# Patient Record
Sex: MALE | Race: OTHER | Employment: OTHER | URBAN - METROPOLITAN AREA
[De-identification: names, ages, dates, MRNs, and addresses within clinical notes are randomized per-mention and may not be internally consistent; named-entity substitution may affect disease eponyms.]

---

## 2017-09-21 NOTE — PATIENT DISCUSSION
POAG, OU: INTRAOCULAR PRESSURE IS WITHIN ACCEPTABLE LIMITS. PT INSTRUCTED TO CONTINUE TRAVATAN Z  AND RETURN FOR FOLLOW-UP AS SCHEDULED.

## 2017-11-06 NOTE — PATIENT DISCUSSION
NO CHANGES IN VISUAL FIELD. CONTINUE CURRENT THERAPY AT THIS TIME.   DO TESTING YEARLY AND FOLLOW UP AS SCHEDULED

## 2018-01-18 NOTE — PATIENT DISCUSSION
MODERATE DRY EYES: PRESCRIBED DISAPPEARING PRESERVATIVE OR PRESERVATIVE FREE ARTIFICIAL TEARS BID ? [id number] A DAY, OU AND THE DAILY INTAKE OF OMEGA-3 DHA/EPA FATTY ACIDS TO HELP RELIEVE SYMPTOMS. ADD NIGHTLY LUBRICATING OINTMENT OR GEL. WILL CONSIDER RESTASIS OR PUNCTAL PLUGS AND/OR LIPIFLOW TREATMENT NEXT VISIT IF NOT RESPONSIVE OR IF SYMPTOMS PERSIST. RETURN FOR FOLLOW-UP AS SCHEDULED OR SOONER IF SYMPTOMS WORSEN.

## 2018-11-06 ENCOUNTER — LASIK CONSULTATION (OUTPATIENT)
Dept: URBAN - METROPOLITAN AREA CLINIC 39 | Facility: CLINIC | Age: 33
End: 2018-11-06

## 2018-11-06 VITALS — HEIGHT: 60 IN | HEART RATE: 58 BPM | SYSTOLIC BLOOD PRESSURE: 121 MMHG | DIASTOLIC BLOOD PRESSURE: 73 MMHG

## 2018-11-06 DIAGNOSIS — H52.203: ICD-10-CM

## 2018-11-06 PROCEDURE — 92025NC COMP. CORNEAL TOPO, UNI OR BILAT,

## 2018-11-06 PROCEDURE — 99499LK REFRACTIVE CONSULT/LASIK

## 2018-11-06 PROCEDURE — 92134 CPTRZ OPH DX IMG PST SGM RTA: CPT

## 2018-11-06 RX ORDER — ZOLPIDEM TARTRATE 5 MG/1: 1 TABLET ORAL EVERY EVENING

## 2018-11-06 RX ORDER — MOXIFLOXACIN HYDROCHLORIDE 5 MG/ML: 1 SOLUTION/ DROPS OPHTHALMIC

## 2018-11-06 RX ORDER — PREDNISOLONE ACETATE 10 MG/ML: 1 SUSPENSION/ DROPS OPHTHALMIC

## 2018-11-06 RX ORDER — OXYCODONE HYDROCHLORIDE AND ACETAMINOPHEN 7.5; 325 MG/1; MG/1: 1 TABLET ORAL AS DIRECTED

## 2018-11-06 ASSESSMENT — VISUAL ACUITY
OD_CC: 20/70-1
OS_CC: 20/70
OS_CC: J1
OS_SC: J6
OD_CC: J1
OD_SC: CF 4FT
OS_SC: CF 4FT
OD_SC: J6

## 2018-11-06 ASSESSMENT — TONOMETRY
OD_IOP_MMHG: 14
OS_IOP_MMHG: 14

## 2018-11-15 ENCOUNTER — SURGERY/PROCEDURE (OUTPATIENT)
Dept: URBAN - METROPOLITAN AREA CLINIC 39 | Facility: CLINIC | Age: 33
End: 2018-11-15

## 2018-11-15 DIAGNOSIS — H52.7: ICD-10-CM

## 2018-11-15 PROCEDURE — 66999CEL EPI-LASEK (CONVENTIONAL EPI-LASIK)

## 2018-11-16 ENCOUNTER — 1 DAY LASIK POST OP (OUTPATIENT)
Dept: URBAN - METROPOLITAN AREA CLINIC 39 | Facility: CLINIC | Age: 33
End: 2018-11-16

## 2018-11-16 DIAGNOSIS — Z98.890: ICD-10-CM

## 2018-11-16 PROCEDURE — 99024 POSTOP FOLLOW-UP VISIT: CPT

## 2018-11-16 RX ORDER — AMOXICILLIN 500 MG/1: 1 CAPSULE ORAL

## 2018-11-16 ASSESSMENT — VISUAL ACUITY
OS_SC: 20/25-1
OD_PH: 20/30+2
OD_SC: 20/70-1

## 2018-11-21 ENCOUNTER — LASIK POST OP (OUTPATIENT)
Dept: URBAN - METROPOLITAN AREA CLINIC 39 | Facility: CLINIC | Age: 33
End: 2018-11-21

## 2018-11-21 DIAGNOSIS — Z98.890: ICD-10-CM

## 2018-11-21 PROCEDURE — 99024 POSTOP FOLLOW-UP VISIT: CPT

## 2018-11-21 ASSESSMENT — VISUAL ACUITY
OS_SC: 20/25+1
OD_SC: J1-
OU_SC: 20/25
OD_SC: 20/30+2
OS_SC: J1
OU_SC: J1

## 2018-12-17 ENCOUNTER — LASIK POST OP (OUTPATIENT)
Dept: URBAN - METROPOLITAN AREA CLINIC 39 | Facility: CLINIC | Age: 33
End: 2018-12-17

## 2018-12-17 DIAGNOSIS — Z98.890: ICD-10-CM

## 2018-12-17 PROCEDURE — 99024 POSTOP FOLLOW-UP VISIT: CPT

## 2018-12-17 ASSESSMENT — TONOMETRY
OD_IOP_MMHG: 10
OS_IOP_MMHG: 10

## 2018-12-17 ASSESSMENT — VISUAL ACUITY
OS_SC: 20/30-2
OU_SC: J1+
OS_SC: J1+
OD_SC: J1
OU_SC: 20/25-2
OD_SC: 20/40+2

## 2019-02-01 NOTE — PATIENT DISCUSSION
PRESERVATIVE FREE ARTIFICIAL TEARS:  I recommended frequent high quality preservative free artificial tears in both eyes.

## 2019-04-18 ENCOUNTER — LASIK POST OP (OUTPATIENT)
Dept: URBAN - METROPOLITAN AREA CLINIC 39 | Facility: CLINIC | Age: 34
End: 2019-04-18

## 2019-04-18 DIAGNOSIS — Z98.890: ICD-10-CM

## 2019-04-18 PROCEDURE — 99024 POSTOP FOLLOW-UP VISIT: CPT

## 2019-04-18 ASSESSMENT — VISUAL ACUITY
OU_SC: 20/40+2
OS_SC: J1
OU_SC: J1+
OD_SC: J5
OS_SC: 20/40-2
OD_SC: 20/80+1

## 2019-04-18 ASSESSMENT — KERATOMETRY
OD_AXISANGLE2_DEGREES: 99
OD_K2POWER_DIOPTERS: 43
OS_K2POWER_DIOPTERS: 41.25
OS_AXISANGLE_DEGREES: 173
OS_AXISANGLE2_DEGREES: 83
OS_K1POWER_DIOPTERS: 38.25
OD_AXISANGLE_DEGREES: 9
OD_K1POWER_DIOPTERS: 38

## 2019-04-18 ASSESSMENT — TONOMETRY
OD_IOP_MMHG: 14
OS_IOP_MMHG: 12

## 2019-05-10 ENCOUNTER — LASIK POST OP (OUTPATIENT)
Dept: URBAN - METROPOLITAN AREA CLINIC 39 | Facility: CLINIC | Age: 34
End: 2019-05-10

## 2019-05-10 DIAGNOSIS — Z98.890: ICD-10-CM

## 2019-05-10 PROCEDURE — 99024 POSTOP FOLLOW-UP VISIT: CPT

## 2019-05-10 ASSESSMENT — KERATOMETRY
OD_AXISANGLE2_DEGREES: 99
OD_K1POWER_DIOPTERS: 38
OS_AXISANGLE2_DEGREES: 83
OS_K2POWER_DIOPTERS: 41.25
OS_K1POWER_DIOPTERS: 38.25
OD_AXISANGLE_DEGREES: 9
OS_AXISANGLE_DEGREES: 173
OD_K2POWER_DIOPTERS: 43

## 2019-05-10 ASSESSMENT — TONOMETRY
OD_IOP_MMHG: 8
OS_IOP_MMHG: 8

## 2019-05-10 ASSESSMENT — VISUAL ACUITY
OD_SC: J10
OS_SC: J1
OD_SC: 20/60-1
OS_SC: 20/20-2

## 2019-05-14 ENCOUNTER — FOLLOW UP (OUTPATIENT)
Dept: URBAN - METROPOLITAN AREA CLINIC 39 | Facility: CLINIC | Age: 34
End: 2019-05-14

## 2019-05-14 DIAGNOSIS — Z98.890: ICD-10-CM

## 2019-05-14 PROCEDURE — 92025 CPTRIZED CORNEAL TOPOGRAPHY: CPT

## 2019-05-14 PROCEDURE — 99024 POSTOP FOLLOW-UP VISIT: CPT

## 2019-05-14 RX ORDER — LOTEPREDNOL ETABONATE 5 MG/ML: 1 SUSPENSION/ DROPS OPHTHALMIC

## 2019-05-14 ASSESSMENT — TONOMETRY
OD_IOP_MMHG: 12
OS_IOP_MMHG: 12

## 2019-05-14 ASSESSMENT — KERATOMETRY
OS_AXISANGLE_DEGREES: 173
OS_K2POWER_DIOPTERS: 41.25
OD_K2POWER_DIOPTERS: 43
OD_K1POWER_DIOPTERS: 38
OS_K1POWER_DIOPTERS: 38.25
OS_AXISANGLE2_DEGREES: 83
OD_AXISANGLE2_DEGREES: 99
OD_AXISANGLE_DEGREES: 9

## 2019-05-14 ASSESSMENT — VISUAL ACUITY
OS_SC: 20/25-2
OS_SC: J1
OD_SC: 20/100
OD_SC: J8

## 2019-07-02 ENCOUNTER — FOLLOW UP (OUTPATIENT)
Dept: URBAN - METROPOLITAN AREA CLINIC 39 | Facility: CLINIC | Age: 34
End: 2019-07-02

## 2019-07-02 DIAGNOSIS — H53.023: ICD-10-CM

## 2019-07-02 DIAGNOSIS — H52.203: ICD-10-CM

## 2019-07-02 DIAGNOSIS — Z98.890: ICD-10-CM

## 2019-07-02 PROCEDURE — 92025 CPTRIZED CORNEAL TOPOGRAPHY: CPT

## 2019-07-02 PROCEDURE — 92012 INTRM OPH EXAM EST PATIENT: CPT

## 2019-07-02 RX ORDER — PREDNISOLONE ACETATE 10 MG/ML: 1 SUSPENSION/ DROPS OPHTHALMIC

## 2019-07-02 RX ORDER — MOXIFLOXACIN HYDROCHLORIDE 5 MG/ML: 1 SOLUTION/ DROPS OPHTHALMIC

## 2019-07-02 ASSESSMENT — KERATOMETRY
OS_K2POWER_DIOPTERS: 41.25
OD_AXISANGLE_DEGREES: 9
OD_K1POWER_DIOPTERS: 38
OS_AXISANGLE2_DEGREES: 83
OS_AXISANGLE_DEGREES: 173
OS_K1POWER_DIOPTERS: 38.25
OD_AXISANGLE2_DEGREES: 99
OD_K2POWER_DIOPTERS: 43

## 2019-07-02 ASSESSMENT — TONOMETRY
OS_IOP_MMHG: 12
OD_IOP_MMHG: 12

## 2019-07-02 ASSESSMENT — VISUAL ACUITY
OS_SC: 20/30
OD_SC: 20/80

## 2019-07-08 NOTE — PATIENT DISCUSSION
POAG OU:  VISUAL FIELD SHOWS INCREASE VISION LOSS OD SUPERIOR NASAL STEP CHANGES AND WNL OS.   WILL DISCUSS OTHER TREATMENT OPTIONS

## 2019-07-19 NOTE — PATIENT DISCUSSION
POAG OU:  VISUAL FIELD SHOWED A SLIGHT PROGRESSION OF VISION LOSS IN THE RIGHT EYE BEGINNING CHANGES, DISCUSSED ADDING ANOTHER DROP VS SLT. WILL CONTINUE TRAVATAN OU QHS.   PATIENT WISHES TO PROCEED WITH SLT OU, WILL SCHEDULE SLT OD THEN OS

## 2019-07-25 ENCOUNTER — SURGERY/PROCEDURE (OUTPATIENT)
Dept: URBAN - METROPOLITAN AREA CLINIC 39 | Facility: CLINIC | Age: 34
End: 2019-07-25

## 2019-07-25 DIAGNOSIS — H52.202: ICD-10-CM

## 2019-07-25 DIAGNOSIS — H52.201: ICD-10-CM

## 2019-07-25 PROCEDURE — 66999E EPI LASEK ENHANCEMENT < 12 MONTHS

## 2019-07-25 ASSESSMENT — KERATOMETRY
OD_K2POWER_DIOPTERS: 43
OS_AXISANGLE2_DEGREES: 83
OS_AXISANGLE_DEGREES: 173
OD_AXISANGLE_DEGREES: 9
OS_K1POWER_DIOPTERS: 38.25
OD_K1POWER_DIOPTERS: 38
OS_K2POWER_DIOPTERS: 41.25
OD_AXISANGLE2_DEGREES: 99

## 2019-07-26 ENCOUNTER — 1 DAY LASIK POST OP (OUTPATIENT)
Dept: URBAN - METROPOLITAN AREA CLINIC 39 | Facility: CLINIC | Age: 34
End: 2019-07-26

## 2019-07-26 DIAGNOSIS — Z98.890: ICD-10-CM

## 2019-07-26 PROCEDURE — 99024 POSTOP FOLLOW-UP VISIT: CPT

## 2019-07-26 ASSESSMENT — VISUAL ACUITY
OD_SC: J3
OD_SC: 20/40
OS_SC: 20/25-1
OS_SC: J1+

## 2019-07-26 ASSESSMENT — KERATOMETRY
OS_K1POWER_DIOPTERS: 38.25
OD_AXISANGLE2_DEGREES: 99
OS_K2POWER_DIOPTERS: 41.25
OS_AXISANGLE2_DEGREES: 83
OS_AXISANGLE_DEGREES: 173
OD_K1POWER_DIOPTERS: 38
OD_K2POWER_DIOPTERS: 43
OD_AXISANGLE_DEGREES: 9

## 2019-08-02 ENCOUNTER — LASIK POST OP (OUTPATIENT)
Dept: URBAN - METROPOLITAN AREA CLINIC 39 | Facility: CLINIC | Age: 34
End: 2019-08-02

## 2019-08-02 DIAGNOSIS — Z98.890: ICD-10-CM

## 2019-08-02 PROCEDURE — 99024 POSTOP FOLLOW-UP VISIT: CPT

## 2019-08-02 ASSESSMENT — KERATOMETRY
OD_K1POWER_DIOPTERS: 38
OD_K2POWER_DIOPTERS: 43
OS_K1POWER_DIOPTERS: 38.25
OD_AXISANGLE_DEGREES: 9
OD_AXISANGLE2_DEGREES: 99
OS_K2POWER_DIOPTERS: 41.25
OS_AXISANGLE2_DEGREES: 83
OS_AXISANGLE_DEGREES: 173

## 2019-08-02 ASSESSMENT — VISUAL ACUITY
OD_PH: 20/25-1
OS_SC: 20/25
OD_SC: 20/50-2
OD_SC: J3
OS_SC: J1

## 2019-08-29 ASSESSMENT — KERATOMETRY
OD_K2POWER_DIOPTERS: 43
OS_AXISANGLE_DEGREES: 173
OS_K1POWER_DIOPTERS: 38.25
OS_AXISANGLE2_DEGREES: 83
OS_K2POWER_DIOPTERS: 41.25
OD_AXISANGLE_DEGREES: 9
OD_AXISANGLE2_DEGREES: 99
OD_K1POWER_DIOPTERS: 38

## 2019-08-30 ENCOUNTER — LASIK POST OP (OUTPATIENT)
Dept: URBAN - METROPOLITAN AREA CLINIC 39 | Facility: CLINIC | Age: 34
End: 2019-08-30

## 2019-08-30 DIAGNOSIS — Z98.890: ICD-10-CM

## 2019-08-30 PROCEDURE — 99024 POSTOP FOLLOW-UP VISIT: CPT

## 2019-08-30 ASSESSMENT — VISUAL ACUITY
OS_SC: 20/25+2
OD_SC: J1
OS_SC: J1
OD_SC: 20/25-2

## 2019-08-30 ASSESSMENT — TONOMETRY
OS_IOP_MMHG: 14
OD_IOP_MMHG: 13

## 2019-09-23 NOTE — PATIENT DISCUSSION
PLAQUENIL:  VISUAL FIELD SHOWS INCREASE NONSPECIFIC CHANGES IN BOTH EYES, WILL SEND TO [facility] FOR FURTHER EVALUATION.

## 2019-11-01 NOTE — PATIENT DISCUSSION
Continue: prednisolone acetate (prednisolone acetate): drops,suspension: 1% 1 drop four times a day into affected eye 11-

## 2020-01-02 ASSESSMENT — KERATOMETRY
OS_AXISANGLE_DEGREES: 173
OD_K2POWER_DIOPTERS: 43
OD_AXISANGLE2_DEGREES: 99
OD_AXISANGLE_DEGREES: 9
OS_AXISANGLE2_DEGREES: 83
OS_K2POWER_DIOPTERS: 41.25
OS_K1POWER_DIOPTERS: 38.25
OD_K1POWER_DIOPTERS: 38

## 2020-01-03 ENCOUNTER — LASIK POST OP (OUTPATIENT)
Dept: URBAN - METROPOLITAN AREA CLINIC 39 | Facility: CLINIC | Age: 35
End: 2020-01-03

## 2020-01-03 DIAGNOSIS — H53.023: ICD-10-CM

## 2020-01-03 DIAGNOSIS — H52.223: ICD-10-CM

## 2020-01-03 PROCEDURE — 99024 POSTOP FOLLOW-UP VISIT: CPT

## 2020-01-03 PROCEDURE — 92015 DETERMINE REFRACTIVE STATE: CPT

## 2020-01-03 ASSESSMENT — VISUAL ACUITY
OD_SC: J1
OS_SC: 20/25-2
OD_SC: 20/30+1
OS_SC: J1+

## 2020-01-03 ASSESSMENT — TONOMETRY
OD_IOP_MMHG: 10
OS_IOP_MMHG: 12

## 2020-02-21 NOTE — PATIENT DISCUSSION
CATARACTS, OU: VISUALLY SIGNIFICANT. SURGERY INDICATED. PATIENT WISHES TO WAIT AT THIS TIME. PATIENT TO CALL BACK IF THEY DECIDE TO PROCEED WITH SURGERY WITHIN 6 MONTHS.

## 2020-06-29 NOTE — PATIENT DISCUSSION
VF SHOWS NO CHANGES COMPARED WITH PREVIOUS VISUAL FIELDS. WILL REPEAT VF IN 6 MONTHS. PT IS ON SAFE DOSE OF [place]QU[place].

## 2020-08-03 NOTE — PATIENT DISCUSSION
POAG OU:  VISUAL FIELD SHOWS STABLE SUPERIOR NASAL STEP DEFECTS OD AND OS WNL. NO CHANGES IN THERAPY AT THIS TIME.

## 2021-01-08 ENCOUNTER — ESTABLISHED COMPREHENSIVE EXAM (OUTPATIENT)
Dept: URBAN - METROPOLITAN AREA CLINIC 39 | Facility: CLINIC | Age: 36
End: 2021-01-08

## 2021-01-08 DIAGNOSIS — H52.223: ICD-10-CM

## 2021-01-08 DIAGNOSIS — H53.023: ICD-10-CM

## 2021-01-08 PROCEDURE — 92015 DETERMINE REFRACTIVE STATE: CPT

## 2021-01-08 PROCEDURE — 92014 COMPRE OPH EXAM EST PT 1/>: CPT

## 2021-01-08 ASSESSMENT — VISUAL ACUITY
OD_SC: 20/40
OS_SC: J1+
OS_SC: 20/25
OU_SC: 20/25
OD_SC: J3

## 2021-01-08 ASSESSMENT — KERATOMETRY
OS_AXISANGLE2_DEGREES: 83
OD_AXISANGLE_DEGREES: 9
OD_K2POWER_DIOPTERS: 41.25
OS_AXISANGLE_DEGREES: 165
OD_AXISANGLE2_DEGREES: 100
OD_AXISANGLE_DEGREES: 010
OS_K2POWER_DIOPTERS: 40.50
OS_K2POWER_DIOPTERS: 41.25
OD_K2POWER_DIOPTERS: 43
OS_AXISANGLE2_DEGREES: 75
OD_K1POWER_DIOPTERS: 38.50
OD_AXISANGLE2_DEGREES: 99
OS_AXISANGLE_DEGREES: 173
OS_K1POWER_DIOPTERS: 38.25
OD_K1POWER_DIOPTERS: 38

## 2021-01-08 ASSESSMENT — TONOMETRY
OS_IOP_MMHG: 10
OD_IOP_MMHG: 10

## 2021-08-17 NOTE — PATIENT DISCUSSION
GONIO WAS ORDERED AND PERFORMED TODAY TO EVALUATE ANGLES FOR GLAUCOMA.  ANGLES ARE OPEN TODAY Patient called stating that she is concerned about her heart rate  She was in the ER last night, and they told her the heart was ok  She said her nerves are shot and wants to know if you can help her with anything  Please advise

## 2022-07-06 NOTE — PATIENT DISCUSSION
The ophthalmic risks of chronic hydroxychloroquine therapy include, but are not limited to; abnormal color vision, decreased central vision, and difficulty adjusting to dim light. Yearly dilated exams are necessary to monitor for toxicity. The patient is encouraged to call back with any visual disturbance, and to keep follow-up appointments as scheduled. 13-Jan-2020 13:03

## 2022-12-07 NOTE — PATIENT DISCUSSION
Continue: Travatan Z (travoprost): drops: 0.004% 1 drop at bedtime into both eyes
General:
Medications:
POAG, OU: INTRAOCULAR PRESSURE IS WITHIN ACCEPTABLE LIMITS. PT INSTRUCTED TO CONTINUE travatan z_ AND RETURN FOR FOLLOW-UP AS SCHEDULED.
PUT 1 DROP INTO BOTH EYES EVERY NIGHT AT BEDTIME
07-Dec-2022 12:07

## 2024-05-24 NOTE — PATIENT DISCUSSION
POAG, OU: INTRAOCULAR PRESSURE IS WITHIN ACCEPTABLE LIMITS. PT INSTRUCTED TO CONTINUE TRAVATAN OU QHS AND RETURN FOR FOLLOW-UP AS SCHEDULED. Regular diet as tolerated.